# Patient Record
(demographics unavailable — no encounter records)

---

## 2024-10-17 NOTE — HISTORY OF PRESENT ILLNESS
[FreeTextEntry6] :  Started having L ear pain for few days but now R ear pain.  No cough but slight congestion. No fevers.

## 2024-11-22 NOTE — HISTORY OF PRESENT ILLNESS
[FreeTextEntry6] : The patient has a sore throat.  It started 1 day ago.  It is getting worse.  There is no fever.  There are no URI signs and symptoms.

## 2024-11-22 NOTE — PHYSICAL EXAM
[NL] : warm, clear [Tenderness With Palpation of Chest Wall] : no tenderness with palpation of chest wall [FreeTextEntry3] : TMs look perfect to me [de-identified] : Throat is somewhat red no pus.

## 2024-12-06 NOTE — DISCUSSION/SUMMARY
[FreeTextEntry1] : I asked the patient and father to be cognizant of how well, how fast she got better after starting the medication.  This is her second episode in a short period of time

## 2024-12-06 NOTE — PHYSICAL EXAM
[Tenderness With Palpation of Chest Wall] : no tenderness with palpation of chest wall [NL] : warm, clear [FreeTextEntry3] : TMs look good bilaterally [de-identified] : Throat is somewhat red no pus.

## 2024-12-06 NOTE — PHYSICAL EXAM
[Tenderness With Palpation of Chest Wall] : no tenderness with palpation of chest wall [NL] : warm, clear [FreeTextEntry3] : TMs look good bilaterally [de-identified] : Throat is somewhat red no pus.

## 2024-12-06 NOTE — HISTORY OF PRESENT ILLNESS
[FreeTextEntry6] : Patient has a sore throat.  It started few days after finishing antibiotics for strep throat.  She finished the medicine last week and was okay for just a few days.  Then her throat started hurting again.  She took all her medicine.  There is no fever.  There are no URI signs and symptoms.  She stated that she did get better shortly after starting her medicine last time.

## 2025-02-04 NOTE — HISTORY OF PRESENT ILLNESS
[FreeTextEntry6] : Patient's been sick for 1 day.  Her temperature was up to 103 F now is lower.  She is coughing.  Her throat hurts.

## 2025-02-04 NOTE — PHYSICAL EXAM
[Clear Rhinorrhea] : clear rhinorrhea [NL] : warm, clear [de-identified] : Throat is just a little red [FreeTextEntry7] : Lungs are clear bilaterally with good air entry

## 2025-06-05 NOTE — PHYSICAL EXAM
[Clear Effusion] : clear effusion [Enlarged Tonsils] : enlarged tonsils [NL] : regular rate and rhythm, normal S1, S2 audible, no murmurs [Conjuctival Injection] : no conjunctival injection [Erythema] : no erythema 7 [Erythematous Oropharynx] : nonerythematous oropharynx

## 2025-06-20 NOTE — HISTORY OF PRESENT ILLNESS
[Mother] : mother [Brushing teeth twice/d] : brushing teeth twice per day [Yes] : Patient goes to dentist yearly [Vitamin] : Primary Fluoride Source: Vitamin [Adequate social interactions] : adequate social interactions [Adequate behavior] : adequate behavior [Adequate performance] : adequate performance [NO] : No [Father] : father [Normal] : Normal [Premenarche] : premenarche [Appropiate parent-child-sibling interaction] : appropriate parent-child-sibling interaction [Has Friends] : has friends [No] : No cigarette smoke exposure [de-identified] : Regular for age  [de-identified] : The patient did well in school this past year socially and academically  [de-identified] : No risks identified

## 2025-06-20 NOTE — DISCUSSION/SUMMARY
[Normal Growth] : growth [School] : school [Development and Mental Health] : development and mental health [Nutrition and Physical Activity] : nutrition and physical activity [Oral Health] : oral health [Safety] : safety [Full Activity without restrictions including Physical Education & Athletics] : Full Activity without restrictions including Physical Education & Athletics [Normal Development] : development

## 2025-06-20 NOTE — PHYSICAL EXAM
[Alert] : alert [No Acute Distress] : no acute distress [Normocephalic] : normocephalic [Conjunctivae with no discharge] : conjunctivae with no discharge [PERRL] : PERRL [EOMI Bilateral] : EOMI bilateral [Auricles Well Formed] : auricles well formed [Clear Tympanic membranes with present light reflex and bony landmarks] : clear tympanic membranes with present light reflex and bony landmarks [No Discharge] : no discharge [Nares Patent] : nares patent [Pink Nasal Mucosa] : pink nasal mucosa [Palate Intact] : palate intact [Nonerythematous Oropharynx] : nonerythematous oropharynx [Supple, full passive range of motion] : supple, full passive range of motion [No Palpable Masses] : no palpable masses [Symmetric Chest Rise] : symmetric chest rise [Clear to Auscultation Bilaterally] : clear to auscultation bilaterally [Regular Rate and Rhythm] : regular rate and rhythm [Normal S1, S2 present] : normal S1, S2 present [No Murmurs] : no murmurs [+2 Femoral Pulses] : +2 femoral pulses [Soft] : soft [NonTender] : non tender [Non Distended] : non distended [No Hepatomegaly] : no hepatomegaly [No Splenomegaly] : no splenomegaly [Martín: ____] : Martín [unfilled] [No Abnormal Lymph Nodes Palpated] : no abnormal lymph nodes palpated [No Gait Asymmetry] : no gait asymmetry [Normal Muscle Tone] : normal muscle tone [Straight] : straight [Cranial Nerves Grossly Intact] : cranial nerves grossly intact [No Rash or Lesions] : no rash or lesions [de-identified] : last year [FreeTextEntry6] : PH 2-3 [de-identified] : Evaluation for scoliosis:  No scoliosis on exam, ( Normal [place] Test).

## 2025-06-20 NOTE — HISTORY OF PRESENT ILLNESS
[Mother] : mother [Brushing teeth twice/d] : brushing teeth twice per day [Yes] : Patient goes to dentist yearly [Vitamin] : Primary Fluoride Source: Vitamin [Adequate social interactions] : adequate social interactions [Adequate behavior] : adequate behavior [Adequate performance] : adequate performance [NO] : No [Father] : father [Normal] : Normal [Premenarche] : premenarche [Appropiate parent-child-sibling interaction] : appropriate parent-child-sibling interaction [Has Friends] : has friends [No] : No cigarette smoke exposure [de-identified] : Regular for age  [de-identified] : The patient did well in school this past year socially and academically  [de-identified] : No risks identified